# Patient Record
Sex: FEMALE | Race: WHITE
[De-identification: names, ages, dates, MRNs, and addresses within clinical notes are randomized per-mention and may not be internally consistent; named-entity substitution may affect disease eponyms.]

---

## 2018-01-04 ENCOUNTER — HOSPITAL ENCOUNTER (INPATIENT)
Dept: HOSPITAL 96 - M.ORTHSURG | Age: 73
LOS: 4 days | Discharge: NURSING, REHAB OR LONG-TERM CARE | DRG: 481 | End: 2018-01-08
Attending: INTERNAL MEDICINE | Admitting: INTERNAL MEDICINE
Payer: MEDICARE

## 2018-01-04 VITALS — DIASTOLIC BLOOD PRESSURE: 74 MMHG | SYSTOLIC BLOOD PRESSURE: 155 MMHG

## 2018-01-04 VITALS — BODY MASS INDEX: 23.22 KG/M2 | HEIGHT: 64.02 IN | WEIGHT: 136 LBS

## 2018-01-04 DIAGNOSIS — S92.352A: ICD-10-CM
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
## 2018-01-04 DIAGNOSIS — S72.012A: Primary | ICD-10-CM

## 2018-01-04 DIAGNOSIS — I42.9: ICD-10-CM

## 2018-01-04 DIAGNOSIS — S90.31XA: ICD-10-CM
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
## 2018-01-04 DIAGNOSIS — E89.0: ICD-10-CM

## 2018-01-04 DIAGNOSIS — Z79.899: ICD-10-CM

## 2018-01-04 DIAGNOSIS — Z82.49: ICD-10-CM

## 2018-01-04 DIAGNOSIS — A50.02: ICD-10-CM

## 2018-01-04 DIAGNOSIS — I10: ICD-10-CM
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
## 2018-01-04 LAB
ALBUMIN SERPL-MCNC: 3.5 G/DL (ref 3.4–5)
ALP SERPL-CCNC: 103 U/L (ref 46–116)
ALT SERPL-CCNC: 25 U/L (ref 30–65)
ANION GAP SERPL CALC-SCNC: 9 MMOL/L (ref 7–16)
AST SERPL-CCNC: 25 U/L (ref 15–37)
BILIRUB SERPL-MCNC: 0.6 MG/DL
BUN SERPL-MCNC: 14 MG/DL (ref 7–18)
CALCIUM SERPL-MCNC: 8.5 MG/DL (ref 8.5–10.1)
CHLORIDE SERPL-SCNC: 108 MMOL/L (ref 98–107)
CO2 SERPL-SCNC: 27 MMOL/L (ref 21–32)
CREAT SERPL-MCNC: 0.7 MG/DL (ref 0.6–1.3)
GLUCOSE SERPL-MCNC: 98 MG/DL (ref 70–99)
HCT VFR BLD CALC: 38 % (ref 37–47)
HGB BLD-MCNC: 12.8 GM/DL (ref 12–15)
INR PPP: 1.1
MCH RBC QN AUTO: 34.2 PG (ref 26–34)
MCHC RBC AUTO-ENTMCNC: 33.7 G/DL (ref 28–37)
MCV RBC: 101.3 FL (ref 80–100)
MPV: 7.4 FL. (ref 7.2–11.1)
PLATELET COUNT*: 199 THOU/UL (ref 150–400)
POTASSIUM SERPL-SCNC: 3.2 MMOL/L (ref 3.5–5.1)
PROT SERPL-MCNC: 6.7 G/DL (ref 6.4–8.2)
PROTHROMBIN TIME: 10.6 SECONDS (ref 9.2–11.5)
RBC # BLD AUTO: 3.75 MIL/UL (ref 4.2–5)
RDW-CV: 14.3 % (ref 10.5–14.5)
SODIUM SERPL-SCNC: 144 MMOL/L (ref 136–145)
WBC # BLD AUTO: 9.6 THOU/UL (ref 4–11)

## 2018-01-05 VITALS — SYSTOLIC BLOOD PRESSURE: 172 MMHG | DIASTOLIC BLOOD PRESSURE: 72 MMHG

## 2018-01-05 VITALS — SYSTOLIC BLOOD PRESSURE: 148 MMHG | DIASTOLIC BLOOD PRESSURE: 62 MMHG

## 2018-01-05 VITALS — SYSTOLIC BLOOD PRESSURE: 168 MMHG | DIASTOLIC BLOOD PRESSURE: 69 MMHG

## 2018-01-05 VITALS — DIASTOLIC BLOOD PRESSURE: 62 MMHG | SYSTOLIC BLOOD PRESSURE: 148 MMHG

## 2018-01-05 VITALS — SYSTOLIC BLOOD PRESSURE: 154 MMHG | DIASTOLIC BLOOD PRESSURE: 71 MMHG

## 2018-01-05 LAB
ABSOLUTE BASOPHILS: 0 THOU/UL (ref 0–0.2)
ABSOLUTE EOSINOPHILS: 0.2 THOU/UL (ref 0–0.7)
ABSOLUTE MONOCYTES: 0.7 THOU/UL (ref 0–1.2)
ANION GAP SERPL CALC-SCNC: 8 MMOL/L (ref 7–16)
BACTERIA-REFLEX: (no result) /HPF
BASOPHILS NFR BLD AUTO: 0.4 %
BILIRUB UR-MCNC: NEGATIVE MG/DL
BUN SERPL-MCNC: 14 MG/DL (ref 7–18)
CALCIUM SERPL-MCNC: 8.2 MG/DL (ref 8.5–10.1)
CHLORIDE SERPL-SCNC: 107 MMOL/L (ref 98–107)
CO2 SERPL-SCNC: 29 MMOL/L (ref 21–32)
COLOR UR: YELLOW
CREAT SERPL-MCNC: 0.8 MG/DL (ref 0.6–1.3)
EOSINOPHIL NFR BLD: 2.3 %
GLUCOSE SERPL-MCNC: 101 MG/DL (ref 70–99)
GRANULOCYTES NFR BLD MANUAL: 71 %
HCT VFR BLD CALC: 35.1 % (ref 37–47)
HGB BLD-MCNC: 12 GM/DL (ref 12–15)
KETONES UR STRIP-MCNC: NEGATIVE MG/DL
LYMPHOCYTES # BLD: 1.3 THOU/UL (ref 0.8–5.3)
LYMPHOCYTES NFR BLD AUTO: 17.3 %
MCH RBC QN AUTO: 34.2 PG (ref 26–34)
MCHC RBC AUTO-ENTMCNC: 34.2 G/DL (ref 28–37)
MCV RBC: 100.2 FL (ref 80–100)
MONOCYTES NFR BLD: 9 %
MPV: 7.5 FL. (ref 7.2–11.1)
MUCUS: (no result) STRN/LPF
NEUTROPHILS # BLD: 5.3 THOU/UL (ref 1.6–8.1)
NUCLEATED RBCS: 0 /100WBC
PLATELET COUNT*: 195 THOU/UL (ref 150–400)
POTASSIUM SERPL-SCNC: 3.5 MMOL/L (ref 3.5–5.1)
PROT UR QL STRIP: NEGATIVE
RBC # BLD AUTO: 3.5 MIL/UL (ref 4.2–5)
RBC # UR STRIP: (no result) /UL
RBC #/AREA URNS HPF: (no result) /HPF (ref 0–2)
RDW-CV: 14.1 % (ref 10.5–14.5)
SODIUM SERPL-SCNC: 144 MMOL/L (ref 136–145)
SP GR UR STRIP: 1.02 (ref 1–1.03)
SQUAMOUS: (no result) /LPF (ref 0–3)
URINE CLARITY: CLEAR
URINE GLUCOSE-RANDOM: (no result)
URINE LEUKOCYTES-REFLEX: NEGATIVE
URINE NITRITE-REFLEX: NEGATIVE
URINE WBC-REFLEX: (no result) /HPF (ref 0–5)
UROBILINOGEN UR STRIP-ACNC: 0.2 E.U./DL (ref 0.2–1)
WBC # BLD AUTO: 7.5 THOU/UL (ref 4–11)

## 2018-01-05 PROCEDURE — 0QS704Z REPOSITION LEFT UPPER FEMUR WITH INTERNAL FIXATION DEVICE, OPEN APPROACH: ICD-10-PCS | Performed by: ORTHOPAEDIC SURGERY

## 2018-01-05 NOTE — NUR
SPOKE WITH PT. SHE WAS ALERT AND ORIENTED. SHE SAID SHE WAS FEELING NAUSEOUS
BUT NURSE AWARE. PT.STATED SHE LIVES WITH HER . SHE HOPES SHE GETS
SURGERY TONIGHT AND DOESN'T HAVE TO WAIT UNTIL TOMORROW. SHE WOULD LIKE TO GO
TO [facility] SWING BED UNIT WHEN READY FOR DISCHARGE. ATTEMPTED
TO CALL ADMISSIONS COORDINATOR AT SWING BED [phone] TO MAKE
REFERRAL. STAFF SAID SHE WAS GONE FOR THE DAY AND DID NOT HAVE VM ON. WILL
MAKE REFERRAL ON MONDAY AS NO OP REPORT OR THERAPIES YET.

## 2018-01-05 NOTE — NUR
PATIENT RECEIVED TO HOSPITAL UNIT AT APPROXIMATELY 2000 VIA AMBULANCE FROM
[place], [place].  PATIENT ACCOMPANIED BY 2 EMT STAFF, HER SPOUSE, AND HER NEICE.
PATIENT PAIN WAS AT 8-9/10 ON PAIN SCALE WHEN SHE ARRIVED.  PATIENT A/O X 4
AND VSS AND WITHOUT HTN.  PATIENT GIVEN FENTANYL X 3 DURING OVERNIGHT SHIFT
FOR PAIN THAT IS LOCATED IN HER LEFT HIP AND LEFT GROIN.  PATIENT VERBALIZES
THAT SHE ONLY RECEIVES PARTIAL PAIN RELIEF OF APPROX. AN 8/10 ON PAIN SCALE
WITH PAIN MEDICATION.  PATIENT CONTINUES TO BE NPO AFTER MIDNIGHT LAST NIGHT
AND HAS REQUESTED ZOFRAN ONCE DURING EVENING FOR NAUSEA.  PATIENT REQUESTS
THAT HER CONSTIPATION BE ADDRESSED D/T THE FACT THAT IT CAN BECOME AN
IMPACTION WITH ONLY STOOL SOFTENER ADMINISTRATION WHEN SHE TAKES NARCOTICS FOR
PAIN.  UPON ARRIVAL TO THE UNIT, PATIENT WAS ACCLIMATED TO THE ROOM AND THE
CALL LIGHT.  NURSING TO FOLLOW-UP AS NECESSARY.  ALL FALL PRECAUTIONS IN
PLACE, INCLUDING CALL LIGHT WITHIN REACH.  WILL CONTINUE TO MONITOR CLOSELY.

## 2018-01-05 NOTE — EKG
[place] [address]
Phone:  [phone]                     ELECTROCARDIOGRAM REPORT      
_______________________________________________________________________________
 
Name:       RADHAMAVERICKNONA REY                Room:           [address]    ADM IN  
M.R.#:  [id number]      Account #:      [id number]  
Admission:  18     Attend Phys:    Donte Dinero MD 
Discharge:               Date of Birth:  [birth year]  
         Report #: [id number]
    [id number]
_______________________________________________________________________________
THIS REPORT FOR:  //name//                      
 
                          [facility]
                                       
Test Date:    2018               Test Time:    09:52:41
Pat Name:     NONA WITT          Department:   
Patient ID:   [id number]            Room:         [address]
Gender:       F                        Technician:   
:          1945               Requested By: Donte Dinero
Order Number: [id number]    Reading MD:   Frank Baird
                                 Measurements
Intervals                              Axis          
Rate:         78                       P:            38
AL:           130                      QRS:          -3
QRSD:         94                       T:            34
QT:           413                                    
QTc:          471                                    
                           Interpretive Statements
Sinus rhythm
No previous ECG available for comparison
 
Electronically Signed On 2018 15:28:01 CST by Frank Baird
[email or web address]
 
 
 
 
 
 
 
 
 
 
 
 
 
 
 
 
 
 
 
 
  <ELECTRONICALLY SIGNED>
                                           By: Frank Baird MD, [facility]   
  18     1528
D: 1852   _____________________________________
T: 1852   Frank Baird MD, FAC     /EPI

## 2018-01-05 NOTE — 2DMMODE
[place] [address]
Phone:  [phone] 2 D/M-MODE ECHOCARDIOGRAM     
_______________________________________________________________________________
 
Name:         MIRYAMNONA               Room:          [address] [place] 
[facility]#:    [id number]     Account #:     [id number]  
Admission:    18    Attend Phys:   Donte Dinero, 
Discharge:                Date of Birth: [birth year]  
Date of Service: 18 1319  Report #:      [id number]
        [id number]
_______________________________________________________________________________
THIS REPORT FOR:  //name//                      
 
 
--------------- APPROVED REPORT --------------
 
 
Study performed:  2018 09:54:03
 
EXAM: Comprehensive 2D, Doppler, and color-flow 
Echocardiogram 
Patient Location: In-Patient   
Room #:  [facility]     Status:  routine
 
      BSA:         1.66
HR: 77 bpm BP:          148/62 mmHg 
Rhythm: NSR     
 
Other Information 
Study Quality: Good
 
Indications
Pre-Op
Cardiomyopathy
 
2D Dimensions
   LVEF(%):  61.06 (&gt;50%)
IVSd:  10.26 (7-11mm) LVOT Diam:  23.67 (18-24mm) 
LVDd:  44.97 mm  
PWd:  10.98 (7-11mm) Ascending Ao:  32.25 (22-36mm)
LVDs:  30.33 (25-40mm) 
Aortic Root:  31.29 mm 
   Moses's LVEF:  61.06 %
 
Volumes
Left Atrial Volume (Systole) 
    LA ESV Index:  26.10 mL/m2
 
Aortic Valve
AoV Peak Adrian.:  1.47 m/s 
AO Peak Gr.:  8.60 mmHg  LVOT Max P.75 mmHg
AO Mean Gr.:  4.43 mmHg  LVOT Mean P.86 mmHg
    LVOT Max V:  1.09 m/s
AO V2 VTI:  28.08 cm  LVOT Mean V:  0.60 m/s
MARIA ELENA (VTI):  3.55 cm2  LVOT V1 VTI:  22.65 cm
AI [place]:  3.68 m/s2  
AI PHT:  360.39 ms  
 
 
[place] [address]
Phone:  [phone]                     2 D/M-MODE ECHOCARDIOGRAM     
_______________________________________________________________________________
 
Name:         NONA WITT               Room:          [address] [place] 
.R.#:    [id number]     Account #:     [id number]  
Admission:    18    Attend Phys:   Donte Dinero, 
Discharge:                Date of Birth: [birth year]  
Date of Service: 18 1319  Report #:      [id number]
        [id number]
_______________________________________________________________________________
 
Mitral Valve
    E/A Ratio:  0.85
    MV Decel. Time:  268.19 ms
MV E Max Adrian.:  0.63 m/s 
MV PHT:  77.77 ms  
MVA (PHT):  2.83 cm2  
 
TDI
E/Lateral E':  6.30 E/Medial E':  7.00
   Medial E' Adrian.:  0.09 m/s
   Lateral E' Adrian.:  0.10 m/s
 
Pulmonary Valve
PV Peak Adrian.:  0.98 m/s PV Peak Gr.:  3.85 mmHg
 
Tricuspid Valve
TR Peak Gr.:  27.65 mmHg RVSP:  32.00 mmHg
 
Left Ventricle
The left ventricle is normal size. There is normal LV segmental wall 
motion. Mild concentric left ventricular hypertrophy. Left 
ventricular systolic function is normal. The left ventricular 
ejection fraction is within the normal range. LVEF is 60-65%. Grade I 
- abnormal relaxation pattern.
 
Right Ventricle
The right ventricle is normal size. The right ventricular systolic 
function is normal.
 
Atria
The left atrium size is normal. The right atrium size is 
normal.
 
Aortic Valve
The aortic valve is normal in structure. Mild aortic regurgitation. 
There is no aortic valvular stenosis.
 
Mitral Valve
The mitral valve flattens with systole but no prolapse is present. 
Trace mitral regurgitation. No evidence of mitral valve 
stenosis.
 
Tricuspid Valve
The tricuspid valve is normal in structure. Mild tricuspid 
regurgitation. The RVSP is 30-35 mmHg.
 
 
[place] [address]
Phone:  [phone]                     2 D/M-MODE ECHOCARDIOGRAM     
_______________________________________________________________________________
 
Name:         NONA WITT               Room:          [address] [place] 
.R.#:    [id number]     Account #:     [id number]  
Admission:    18    Attend Phys:   Donte Dinero, 
Discharge:                Date of Birth: [birth year]  
Date of Service: 18 1319  Report #:      [id number]
        [id number]
_______________________________________________________________________________
 
Pulmonic Valve
The pulmonary valve is normal in structure. Mild pulmonic 
regurgitation.
 
Great Vessels
The aortic root is normal in size. IVC is normal in size and 
collapses with &gt;50% inspiration
 
Pericardium
There is no pericardial effusion.
 
&lt;Conclusion&gt;
LVEF is 60-65%.
There is normal LV segmental wall motion.
Grade I - abnormal relaxation pattern.
There is no aortic valvular stenosis.
Mild aortic regurgitation.
Trace mitral regurgitation.
Mild concentric left ventricular hypertrophy.
 
 
 
 
 
 
 
 
 
 
 
 
 
 
 
 
 
 
 
 
 
 
 
 
  <ELECTRONICALLY SIGNED>
                                           By: Frank Baird MD, FACC   
  18     1319
D: 18   _____________________________________
T: 18   Frank Baird MD, FACC     /INF

## 2018-01-05 NOTE — NUR
ASSUMED CARE OF PATIENT AFTER MORNING REPORT. ALERT AND ORIENTED X4.
ASSESSMENT COMPLETED AND IS CHARTED. VSS ON ROOM AIR. PATIENTS PAIN AND NAUSEA
HAVE BEEN MANAGED WITH MEDICATION. PATIENT LEFT UNIT FOR PACU AT APPROXIMATELY
1600. HOURLY ROUNDS WERE MAINTAINED WHILE PATIENT WA ON THE UNIT. WHEN PATIENT
RETURNS FROM PACU NURSING WILL CONTINUE TO MONITOR.

## 2018-01-06 VITALS — DIASTOLIC BLOOD PRESSURE: 79 MMHG | SYSTOLIC BLOOD PRESSURE: 146 MMHG

## 2018-01-06 VITALS — SYSTOLIC BLOOD PRESSURE: 150 MMHG | DIASTOLIC BLOOD PRESSURE: 51 MMHG

## 2018-01-06 VITALS — DIASTOLIC BLOOD PRESSURE: 55 MMHG | SYSTOLIC BLOOD PRESSURE: 104 MMHG

## 2018-01-06 VITALS — DIASTOLIC BLOOD PRESSURE: 63 MMHG | SYSTOLIC BLOOD PRESSURE: 161 MMHG

## 2018-01-06 VITALS — SYSTOLIC BLOOD PRESSURE: 100 MMHG | DIASTOLIC BLOOD PRESSURE: 48 MMHG

## 2018-01-06 LAB
HCT VFR BLD CALC: 32.3 % (ref 37–47)
HGB BLD-MCNC: 11 GM/DL (ref 12–15)

## 2018-01-06 NOTE — NUR
ASSUMED CARE OF PATIENT AFTER REPORT THIS MORNING.  PATIENT AWAKE, ALERT, AND
ORIENTED APPROPRIATELY.  CRYING FROM PAIN.  PHYSICAL ASSESSMENT COMPLETED AND
AS CHARTED.  GIVEN PRN AND SCHEDULED MEDICATIONS, SEE EMAR FOR DOCUMENTATION.
VITAL SIGNS STABLE, OXYGEN SATURATION WITHIN NORMAL LIMITS ON ROOM AIR.
PATIENT WORKED WITH PHYSICAL THERAPY TODAY.  SAT IN CHAIR AT BEDSIDE THIS
AFTERNOON.  SITTING IN CHAIR AT BEDSIDE AT THIS TIME.  USES BEDSIDE COMMODE.
TRANSFERS AND AMBULATES WITH ASSISTANCE FROM STAFF.  DENIES NEEDS AT THIS
TIME.  CALL LIGHT WITHIN REACH, USES APPROPRIATELY.  NURSING WILL CONTINUE TO
MONITOR.

## 2018-01-06 NOTE — NUR
THIS NURSE ASSUMES CARE OF PT 01/05/18 AT 1930, PT IS ALERT AND ORIENTED X4,
PT IN PLEASANT MOOD, EXPRESSES THAT SHE IS READY TO GO HOME, LUNGS CTA WITH
SOME COARSENESS TO LEFT LUNG SIERRA NOTED, PT REPORTS LOOSE COUGH WITH SMALL
AMOUNT OF YELLOW SPUTUM STILL OCURRING, PT IS AFEBRILE, CONTINUES TREATMENT
FOR THRUSH, REPORTS THAT IS HARD TO CATCH HER BREATH SOMETIMES EVEN AT REST
SHE SAYS THE BREATHING TREATMENTS HELP, PT UP WITH STAND BY ASSIST, STEADY
GAIT, CONTINUES IV FLUIDS, REPORTED TO ANUJA

## 2018-01-06 NOTE — NUR
THIS NURSE ASSUMES CARE OF PT 01/05/18 AT 1930, PT RETURNS FROM SURGERY ALERT
AND ORIENTED X4, DRESSING TO LLE DRY AND INTACT, PT DENIES NAUSEA, EATS SNACK
WITHOUT DIFFICULTY, PT RECIEVES HS MEDS AND LAXATIVE, PTS PAIN CONTROLLED WITH
PO MEDS THROUGHOUT THE NIGHT, PT DID NOT GET OUT OF BED THIS SHIFT, THIS AM
PTS BLOOD PRESSURE 90S/40S, MANUAL /46, DR KINNEY NOTIFIED, FLUID BOLUS
ORDERED, REPORTED TO ANUJA WEINER

## 2018-01-07 VITALS — SYSTOLIC BLOOD PRESSURE: 136 MMHG | DIASTOLIC BLOOD PRESSURE: 52 MMHG

## 2018-01-07 VITALS — SYSTOLIC BLOOD PRESSURE: 142 MMHG | DIASTOLIC BLOOD PRESSURE: 49 MMHG

## 2018-01-07 VITALS — DIASTOLIC BLOOD PRESSURE: 58 MMHG | SYSTOLIC BLOOD PRESSURE: 141 MMHG

## 2018-01-07 VITALS — SYSTOLIC BLOOD PRESSURE: 125 MMHG | DIASTOLIC BLOOD PRESSURE: 62 MMHG

## 2018-01-07 VITALS — DIASTOLIC BLOOD PRESSURE: 68 MMHG | SYSTOLIC BLOOD PRESSURE: 144 MMHG

## 2018-01-07 LAB
HCT VFR BLD CALC: 28.9 % (ref 37–47)
HGB BLD-MCNC: 9.7 GM/DL (ref 12–15)

## 2018-01-07 NOTE — NUR
PATIENT REMAINS ALERT AND ORIENTED. TRANSFERS AND AMBULATES WITH ASSIST OF 1.
ENEMA THIS EVENING FOR CONSTIPATION. PATIENT STILL HAD TO BE DISEMPACTED
DESPITE THE ENEMA, AND LARGE AMOUNT OF STOOL REMOVED. PAIN CONTROLLED ON PO
MEDS IV SALINE LOCKED. TOLERATING MEALS. PARTICIPATED WITH THERAPY. BED ALARM
IN USE. CALL LIGHT WITHIN REACH. WILL CONTINUE TO MONITOR.

## 2018-01-07 NOTE — NUR
PATIENT ORIENTED X4 ON HOURLY ROUNDS. PAIN CONTROLLED WITH OXY IR AND LALITO
TRAMADOL. DRESSING TO LEFT HIP CLEAN, DRY AND INTACT. UP WITH ASSIST X1 TO
BSC. VITALS STABLE ON ROOM AIR. WILL CONTINUE TO MONITOR.

## 2018-01-08 VITALS — SYSTOLIC BLOOD PRESSURE: 133 MMHG | DIASTOLIC BLOOD PRESSURE: 52 MMHG

## 2018-01-08 VITALS — DIASTOLIC BLOOD PRESSURE: 37 MMHG | SYSTOLIC BLOOD PRESSURE: 108 MMHG

## 2018-01-08 VITALS — SYSTOLIC BLOOD PRESSURE: 128 MMHG | DIASTOLIC BLOOD PRESSURE: 62 MMHG

## 2018-01-08 NOTE — NUR
CM CONTACTED [facility] SWING BED UNIT AND SPOKE TO VILMA TO
INFORM OF THE REFERRAL FOR SWING BED, AND FAXED PATIENTS FACESHEET, H&P, MED
LIST, PT/OT NOTES, AND LABS. CM SPOKE TO THE PATIENT TO INFORM OF THIS AND
THAT CM WOULD FOLLOW-UP WHEN MORE INFO IS AVAILABLE. CM WILL REMAIN AVAILABLE
TO ASSIST AND FOLLOW AS NEEDED.

## 2018-01-08 NOTE — NUR
PATIENT ORIENTED X4 ON HOURLY ROUNDS. UP WITH ASSIST X1. TOLERATING DIET.
DENIES NAUSEA. DRESSING TO LEFT HIP IS DRY AND INTACT. VITALS STABLE ON ROOM
AIR. NO BM THIS SHIFT. CONTINUE TO MONITOR.

## 2018-01-08 NOTE — NUR
PATIENT DISCHARGED TO [facility] BED. IV REMOVED. COPY OF DC
INSTRUCTIONS SENT WITH PATIENT. REPORT CALLED.  AWARE OF TRANSFER.
TRANSPORTED VIA WHEELCHAIR VAN.

## 2018-01-09 NOTE — CON
[address]  [address]                    CONSULTATION                  
_______________________________________________________________________________
 
Name:       NONA WITT                Room:           [address] [place]  
M.R.#:  [id number]      Account #:      [id number]  
Admission:  01/04/18     Attend Phys:    Donte Dinero MD 
Discharge:  01/08/18     Date of Birth:  [birth year]  
         Report #: [id number]
                                                                     [id number]  
_______________________________________________________________________________
THIS REPORT FOR:  //name//                      
 
CC: Donte TAYLOR
 
REQUESTING PHYSICIAN:  Donte Dinero MD
 
PRIMARY CARE PHYSICIAN:  Dr. Hawthorne, [facility] and Dr. Taylor.
 
CHIEF COMPLAINT:  Hip fracture.
 
HISTORY OF PRESENT ILLNESS:  The patient is a 72-year-old woman who fell down
going down the steps and fractured her left hip.  She is being seen for
evaluation and preoperative status.  She has a remote history of cardiomyopathy.
 Clinically, she denies chest pain or pressure.  Her fall was not precipitated
by palpitations, dizziness, or presyncope.
 
She does have a history of connective tissue disease without apparent vascular
involvement, though carries a diagnosis of cardiomyopathy.  An echocardiogram
was performed earlier this morning, which demonstrated preserved LV function. 
Clinically, from a heart failure standpoint, she denies shortness of breath,
orthopnea or PND.  She has not had any hospitalizations for congestive heart
failure, coronary artery disease or other vascular etiologies.  She has no neuro
symptoms, slurred speech, numbness or weakness or visual changes.
 
PAST MEDICAL HISTORY:  Significant for Wegener's disease and cardiomyopathy. 
She was treated aggressively with beta blockers and still remains on carvedilol.
 She has no documented history of vascular involvement with regard to her
connective tissue disease, she has never had stroke, TIA, MI or peripheral
vascular disease.
 
PAST SURGICAL HISTORY:  Thyroidectomy, 2 C-sections, and has fractured her right
hip.
 
FAMILY HISTORY:  Positive for heart disease.  Maternally, her mother has had a
heart attack.  She herself has had a stress test at the time of her
cardiomyopathy diagnosis, which was normal.  There is no family history of
sudden death.
 
SOCIAL HISTORY:  She is .  There is no tobacco or ethanol history.
 
CURRENT MEDICATIONS:  Include the following:  Lisinopril 2.5 mg daily,
prednisone 5 mg daily, ceftriaxone, Synthroid 0.125 mg daily and Coreg 12.5 mg
p.o. b.i.d.
 
 
 
 
[place] [address]                    CONSULTATION                  
_______________________________________________________________________________
 
Name:       NONA WITT                Room:           [address]#:  [id number]      Account #:      [id number]  
Admission:  01/04/18     Attend Phys:    Donte Dinero MD 
Discharge:  01/08/18     Date of Birth:  [birth year]  
         Report #: [id number]
                                                                     [id number]  
_______________________________________________________________________________
SOCIAL HISTORY:  She is .  There is no tobacco or ethanol history.
 
REVIEW OF SYSTEMS:
CENTRAL NERVOUS SYSTEM:  No seizure or paralysis.
GENERAL:  No weight loss or fevers.
RESPIRATORY:  No cough or sputum production.  No asthma or emphysema.
CARDIOVASCULAR:  No palpitations, no chest discomfort, no orthopnea, no PND.
PERIPHERAL ARTERY DISEASE:  Denies history of carotid vascular disease,
claudication, numbness or weakness.
GASTROINTESTINAL:  No vomiting, vomiting blood or ulcers.
GENITOURINARY:  No dysuria or hematuria.
HEMATOLOGIC:  No anemia or bleeding disorders.
ALLERGIES:  No seasonal, medical, aspirin or contrast allergies.
PSYCHIATRIC:  No depression or anxiety.
MUSCULOSKELETAL:  No arthritis.  Positive connective tissue disease.
EYES:  She does use glasses.  No visual changes.
 
PHYSICAL EXAMINATION:
VITAL SIGNS:  Blood pressure is 148/62, pulse is 85.
GENERAL:  This is a pleasant elderly female.  She is alert, oriented, in no
apparent distress.
NECK:  Supple.  No jugular venous distention.
CARDIOVASCULAR:  Irregular.  There is no murmur or S3.
LUNGS:  Clear to auscultation.
ABDOMEN:  Soft, nontender.
EXTREMITIES:  There is no peripheral edema.
 
Electrocardiogram demonstrates a sinus rhythm, normal ST segments, rate of 78. 
Hemoglobin is 12.0, white blood cell count 7.5, platelet count is 195,000. 
Sodium is 144, potassium 3.5, chloride 107, BUN is 14, and creatinine is 0.8.
 
IMPRESSION:
1.  Preoperative evaluation.  She has a functional history of probably 5-6 mets
and reports no angina type symptoms and no documented history of coronary artery
disease and in the past, has had negative stress testing with the workup of her
cardiomyopathy.  I do not see the need for any further testing.  I would
estimate her cardiovascular risk to be low.
2.  Cardiomyopathy.  Her left ventricular function has normalized and she is on
aggressive medical therapy.  I made no changes.
3.  Hypertension, stable.
4.  Hip fracture.
5.  Wegener's disease.  At this point in time, it appears that she does not have
any vascular involvement.
 
 
 
 
[address]  [address]                    CONSULTATION                  
_______________________________________________________________________________
 
Name:       NONA WITT                Room:           [address] [place]  
M.R.#:  [id number]      Account #:      [id number]  
Admission:  01/04/18     Attend Phys:    Donte Dinero MD 
Discharge:  01/08/18     Date of Birth:  [birth year]  
         Report #: [id number]
                                                                     [id number]  
_______________________________________________________________________________
Thank you for allowing me to participate in her care.  She will routinely follow
up with her usual cardiologist.
 
 
 
 
 
 
 
 
 
 
 
 
 
 
 
 
 
 
 
 
 
 
 
 
 
 
 
 
 
 
 
 
 
 
 
 
 
 
 
 
 
 
<ELECTRONICALLY SIGNED>
                                        By:  Frank Baird MD, FACC   
01/09/18     0946
D: 01/05/18 1032_______________________________________
T: 01/05/18 1235Frank Baird MD, FACC      /nt

## 2018-01-10 NOTE — OP
[address]  [address]                    OPERATIVE REPORT              
_______________________________________________________________________________
 
Name:       NONA WITT                Room:           [address] [place]  
M.R.#:  [id number]      Account #:      [id number]  
Admission:  01/04/18     Attend Phys:    Donte Dinero MD 
Discharge:  01/08/18     Date of Birth:  [birth year]  
         Report #: [id number]
                                                                     [id number]  
_______________________________________________________________________________
THIS REPORT FOR:  //name//                      
 
CC: Donte CHRISTOPHER Tutor Key
 
DICTATED BY: João Zhou DO
 
DATE OF SERVICE:  01/05/2018
 
 
PREOPERATIVE DIAGNOSIS:  Left minimally displaced valgus impacted subcapital
femoral neck fracture.
 
POSTOPERATIVE DIAGNOSIS:  Left minimally displaced valgus impacted subcapital
femoral neck fracture.
 
PROCEDURE PERFORMED:  Left open reduction and internal fixation of left femoral
neck fracture with cannulated screws.
 
OPERATIVE SURGEON:  Helio Hoyt DO
 
ASSISTANT:  João Zhou DO
 
PREOPERATIVE ANTIBIOTICS:  2 grams of Ancef given IV 30 minutes prior to
incision.
 
INDICATIONS:  The patient is a pleasant 72-year-old female who unfortunately
fell while she was ambulating downstairs at her children's house.  She landed on
her left hip, had immediate onset pain and inability to bear weight on the left
lower extremity.  She presented initially to [facility] where she was
evaluated and determined to have a left femoral neck fracture.  She was then
transported to [facility] for further evaluation.  We discussed
indications of the left femoral neck fracture and stated that we would recommend
open reduction and internal fixation with in situ pinning with cannulated
screws.  The patient is in full understanding and agrees with this plan. 
Therefore, all the risks, benefits, treatment options, alternatives,
complications, indications were discussed with the patient.  Risks including but
not limited to neurovascular injury, continued pain, continued bleeding, need
for repeat surgery, rhabdomyolysis, DVT, PE, death as well as inherent
complications of anesthesia.  The patient assumed the risks and wanted to
proceed with surgery.
 
DESCRIPTION OF PROCEDURE:  The patient was seen in the preoperative bay where
consent was obtained and the operative site was marked with the initials of
myself, Dr. Helio Hoyt.  The patient was then transported to the operative
suite and given the benefit of general anesthesia.  The patient was then placed
 
 
 
[place] [address]                    OPERATIVE REPORT              
_______________________________________________________________________________
 
Name:       NONA WITT                Room:           [address]#:  [id number]      Account #:      [id number]  
Admission:  01/04/18     Attend Phys:    Donte Dinero MD 
Discharge:  01/08/18     Date of Birth:  [birth year]  
         Report #: [id number]
                                                                     [id number]  
_______________________________________________________________________________
onto the Burton table with the left foot placed in the appropriate boot and leg
lópez, and the right leg abducted and externally rotated to appropriate
position not to sublux the patient's previous jaswant hip arthroplasty and was well
padded.  C-arm was then utilized and the femoral neck was deemed to be still in
appropriate position to proceed with open reduction internal fixation with in
situ pinning with cannulated screws.  Then, the patient was sterilely prepped
and draped in normal sterile fashion.  After that, a timeout was had indicating
appropriate patient, procedure to be performed, operative site, preoperative
antibiotics, operative surgeon.  All in attendance were in agreement. 
Therefore, incision was marked on the left hip and landmarks of the greater
trochanter and inferior border of the neck were obtained utilizing C-arm and
Steinmann pins.  Incision was then made with a 10 blade scalpel through the
level of subcutaneous tissue, then sharp dissection was carried out through the
IT band.  A key elevator was then used to free soft tissues from the lateral
aspect of the femur.  The first Steinmann pin was then inserted in the superior
central portion of the femoral neck and deemed to be in appropriate position
with C-arm.  This was advanced to be in appropriate position with C-arm on both
AP and lateral views.  Next, the guide was then used to place the posterior
inferior Steinmann pin and deemed to be in appropriate position with AP and
lateral images.  Lastly, the anterior inferior Steinmann pin was then placed
using freehand technique.  This was deemed to be in appropriate position on AP
and lateral views as well.  Opening reamer was then used for all 3 screws and
then screws were measured and deemed for the superior screw to be 80 mm,
posterior inferior screw to be 75 mm and anterior inferior screw to be 80 mm. 
Screws were then placed in sequential fashion and hand tightened.  These were
all deemed to be in appropriate position on AP and lateral views of the C-arm. 
Final radiographs were saved.  Wound was then thoroughly irrigated with sterile
saline and 0 Vicryl was used to approximate the IT band.  The wound was then
again thoroughly irrigated and then 2-0 Monocryl was then used in simple
interrupted inverted fashion to approximate the skin.  Then, a running 3-0 V-Loc
was used in a subcuticular stitch followed by Dermabond.  The wound was dressed
with Mepilex.  The patient was transported to the PACU in stable condition.
 
 
 
 
 
 
 
 
 
 
 
 
<ELECTRONICALLY SIGNED>
                                        By:  Helio Hoyt DO              
01/10/18     0651
D: 01/07/18 1639_______________________________________
T: 01/07/18 1723Cstephany Hoyt DO                 /nt